# Patient Record
Sex: MALE | Race: BLACK OR AFRICAN AMERICAN
[De-identification: names, ages, dates, MRNs, and addresses within clinical notes are randomized per-mention and may not be internally consistent; named-entity substitution may affect disease eponyms.]

---

## 2019-11-13 ENCOUNTER — HOSPITAL ENCOUNTER (EMERGENCY)
Dept: HOSPITAL 62 - ER | Age: 64
LOS: 2 days | Discharge: ANOTHER HOSPITAL | End: 2019-11-15
Payer: MEDICARE

## 2019-11-13 DIAGNOSIS — I25.2: ICD-10-CM

## 2019-11-13 DIAGNOSIS — I11.0: ICD-10-CM

## 2019-11-13 DIAGNOSIS — R11.0: ICD-10-CM

## 2019-11-13 DIAGNOSIS — I50.9: ICD-10-CM

## 2019-11-13 DIAGNOSIS — I16.0: ICD-10-CM

## 2019-11-13 DIAGNOSIS — I25.10: ICD-10-CM

## 2019-11-13 DIAGNOSIS — R07.9: ICD-10-CM

## 2019-11-13 DIAGNOSIS — R20.0: ICD-10-CM

## 2019-11-13 DIAGNOSIS — I21.9: Primary | ICD-10-CM

## 2019-11-13 DIAGNOSIS — E11.9: ICD-10-CM

## 2019-11-13 DIAGNOSIS — R06.02: ICD-10-CM

## 2019-11-13 DIAGNOSIS — M79.601: ICD-10-CM

## 2019-11-13 DIAGNOSIS — Z87.891: ICD-10-CM

## 2019-11-13 DIAGNOSIS — J44.9: ICD-10-CM

## 2019-11-13 LAB
ADD MANUAL DIFF: NO
ALBUMIN SERPL-MCNC: 4.9 G/DL (ref 3.5–5)
ALP SERPL-CCNC: 59 U/L (ref 38–126)
ANION GAP SERPL CALC-SCNC: 11 MMOL/L (ref 5–19)
AST SERPL-CCNC: 36 U/L (ref 17–59)
BASOPHILS # BLD AUTO: 0 10^3/UL (ref 0–0.2)
BASOPHILS NFR BLD AUTO: 0.8 % (ref 0–2)
BILIRUB DIRECT SERPL-MCNC: 0.3 MG/DL (ref 0–0.4)
BILIRUB SERPL-MCNC: 1.3 MG/DL (ref 0.2–1.3)
BUN SERPL-MCNC: 17 MG/DL (ref 7–20)
CALCIUM: 9.8 MG/DL (ref 8.4–10.2)
CHLORIDE SERPL-SCNC: 100 MMOL/L (ref 98–107)
CK MB SERPL-MCNC: 12.1 NG/ML (ref ?–4.55)
CK SERPL-CCNC: 224 U/L (ref 55–170)
CO2 SERPL-SCNC: 28 MMOL/L (ref 22–30)
EOSINOPHIL # BLD AUTO: 0.2 10^3/UL (ref 0–0.6)
EOSINOPHIL NFR BLD AUTO: 4.3 % (ref 0–6)
ERYTHROCYTE [DISTWIDTH] IN BLOOD BY AUTOMATED COUNT: 14.4 % (ref 11.5–14)
GLUCOSE SERPL-MCNC: 123 MG/DL (ref 75–110)
HCT VFR BLD CALC: 53.7 % (ref 37.9–51)
HGB BLD-MCNC: 18.3 G/DL (ref 13.5–17)
LYMPHOCYTES # BLD AUTO: 1.8 10^3/UL (ref 0.5–4.7)
LYMPHOCYTES NFR BLD AUTO: 32.6 % (ref 13–45)
MCH RBC QN AUTO: 30.1 PG (ref 27–33.4)
MCHC RBC AUTO-ENTMCNC: 34 G/DL (ref 32–36)
MCV RBC AUTO: 89 FL (ref 80–97)
MONOCYTES # BLD AUTO: 0.6 10^3/UL (ref 0.1–1.4)
MONOCYTES NFR BLD AUTO: 9.8 % (ref 3–13)
NEUTROPHILS # BLD AUTO: 3 10^3/UL (ref 1.7–8.2)
NEUTS SEG NFR BLD AUTO: 52.5 % (ref 42–78)
PLATELET # BLD: 185 10^3/UL (ref 150–450)
POTASSIUM SERPL-SCNC: 4.5 MMOL/L (ref 3.6–5)
PROT SERPL-MCNC: 8.5 G/DL (ref 6.3–8.2)
RBC # BLD AUTO: 6.06 10^6/UL (ref 4.35–5.55)
TOTAL CELLS COUNTED % (AUTO): 100 %
TROPONIN I SERPL-MCNC: 0.76 NG/ML
WBC # BLD AUTO: 5.6 10^3/UL (ref 4–10.5)

## 2019-11-13 PROCEDURE — 71045 X-RAY EXAM CHEST 1 VIEW: CPT

## 2019-11-13 PROCEDURE — 96376 TX/PRO/DX INJ SAME DRUG ADON: CPT

## 2019-11-13 PROCEDURE — 85025 COMPLETE CBC W/AUTO DIFF WBC: CPT

## 2019-11-13 PROCEDURE — 99291 CRITICAL CARE FIRST HOUR: CPT

## 2019-11-13 PROCEDURE — 85730 THROMBOPLASTIN TIME PARTIAL: CPT

## 2019-11-13 PROCEDURE — 96368 THER/DIAG CONCURRENT INF: CPT

## 2019-11-13 PROCEDURE — 82553 CREATINE MB FRACTION: CPT

## 2019-11-13 PROCEDURE — 96365 THER/PROPH/DIAG IV INF INIT: CPT

## 2019-11-13 PROCEDURE — 36415 COLL VENOUS BLD VENIPUNCTURE: CPT

## 2019-11-13 PROCEDURE — 80053 COMPREHEN METABOLIC PANEL: CPT

## 2019-11-13 PROCEDURE — 93010 ELECTROCARDIOGRAM REPORT: CPT

## 2019-11-13 PROCEDURE — 82550 ASSAY OF CK (CPK): CPT

## 2019-11-13 PROCEDURE — 84484 ASSAY OF TROPONIN QUANT: CPT

## 2019-11-13 PROCEDURE — 93005 ELECTROCARDIOGRAM TRACING: CPT

## 2019-11-13 NOTE — EKG REPORT
SEVERITY:- ABNORMAL ECG -

SINUS RHYTHM

PROBABLE LEFT ATRIAL ABNORMALITY

RBBB AND LPFB

SIGNATURE FAXED FROM MD

INFERIOR INFARCT, POSSIBLY ACUTE EXTENSION VERSUS DYSKINETIC NFERIOR WALL.CORELATE CLINICALLY

:

Confirmed by: Gail Gutierrez MD 13-Nov-2019 11:07:14

## 2019-11-13 NOTE — EKG REPORT
SEVERITY:- ABNORMAL ECG -

SINUS TACHYCARDIA

AMAIRANI, CONSIDER BIATRIAL ABNORMALITIES

RBBB AND LPFB

INFERIOR INFARCT, ACUTE

ANTEROLATERAL INFARCT, AGE INDETERMINATE

:

Confirmed by: Gail Gutierrez MD 13-Nov-2019 11:08:06

## 2019-11-13 NOTE — EKG REPORT
SEVERITY:- ABNORMAL ECG -

SINUS RHYTHM

VENTRICULAR TRIGEMINY

PROBABLE LEFT ATRIAL ABNORMALITY

RBBB AND LPFB

ST DEPRESSION, CONSIDER ISCHEMIA, LAT LEADS

:

Confirmed by: Gail Gutierrez MD 13-Nov-2019 11:05:01

## 2019-11-13 NOTE — ER DOCUMENT REPORT
ED Cardiac





- General


Chief Complaint: Chest Pain


Stated Complaint: CHEST PAIN/RIGHT ARM PAIN


Time Seen by Provider: 11/13/19 06:55


Primary Care Provider: 


PREETI THOMSON MD [Primary Care Provider] - Follow up as needed


Mode of Arrival: Ambulatory


Information source: Patient


TRAVEL OUTSIDE OF THE [place] IN LAST 30 DAYS: No





- HPI


Notes: 





Patient presents with chest pain since 1:30 AM this morning.  Patient states it 

radiates to his right arm.  He states it has been intermittent.  He does not 

know of anything that makes it better or worse.  He states he took 2 

nitroglycerin at home without significant change of the pain.  He states that 

the pain is lasting several minutes at a time.  Patient had some mild shortness 

of breath and some mild nausea.  He states he also has some numbness in his 

right arm.  He says he has had 3 previous stents place.  He also states he has 

had 3 strokes last one about 3 years ago.  Patient denies any cocaine or illicit

drug use.  He says he is not a smoker.  Patient had no vomiting or diarrhea.  No

recent cough cold or congestion.  Says in general he just does not feel well.  

The pain has been moderate to severe when he has it.





- Related Data


Allergies/Adverse Reactions: 


                                        





Iodinated Contrast Media [IV Dye, Iodine Containing] Allergy (Verified 07/10/18 

15:08)


   SKIN ROUGH











Past Medical History





- General


Information source: Patient





- Social History


Smoking Status: Former Smoker


Frequency of alcohol use: None


Drug Abuse: None


Family History: Reviewed & Not Pertinent, Hypertension





- Past Medical History


Cardiac Medical History: Reports: Hx Congestive Heart Failure, Hx Coronary 

Artery Disease, Hx Heart Attack - [age], Hx Hypercholesterolemia, Hx Hypertension


Pulmonary Medical History: Reports: Hx Asthma, Hx Bronchitis, Hx COPD, Hx 

Pneumonia


Neurological Medical History: Reports: Hx Cerebrovascular Accident - 2011.  

Denies: Hx Seizures


Endocrine Medical History: Reports: Hx Diabetes Mellitus Type 2


Renal/ Medical History: Reports: Hx Benign Prostatic Hyperplasia.  Denies: Hx 

Peritoneal Dialysis


GI Medical History: Reports: Hx Gastroesophageal Reflux Disease


Musculoskeletal Medical History: Denies Hx Arthritis


Psychiatric Medical History: 


   Denies: Hx Depression


Past Surgical History: Reports: Hx Abdominal Surgery - hernia, Hx Cardiac 

Catheterization, Hx Cardiac Surgery - 8 stents, bypass sx, defibrilator, Hx 

Cholecystectomy, Hx Coronary Artery Bypass Graft - x 3, Hx Coronary Stent - x 8,

Hx Pacemaker, Hx Tonsillectomy





- Immunizations


Immunizations up to date: Yes


Hx Diphtheria, Pertussis, Tetanus Vaccination: Yes


Hx Pneumococcal Vaccination: 08/18/14





Review of Systems





- Review of Systems


Constitutional: denies: Chills, Fever


Cardiovascular: Chest pain.  denies: Palpitations


Respiratory: Short of breath.  denies: Cough


Gastrointestinal: denies: Diarrhea, Vomiting


-: Yes All other systems reviewed and negative





Physical Exam





- Vital signs


Interpretation: Normal





- General


General appearance: Appears well, Alert





- HEENT


Head: Normocephalic, Atraumatic


Eyes: Normal


Pupils: PERRL





- Respiratory


Respiratory status: No respiratory distress


Chest status: Nontender


Breath sounds: Normal


Chest palpation: Normal





- Cardiovascular


Rhythm: Tachycardia


Heart sounds: Normal auscultation


Murmur: No





- Abdominal


Inspection: Normal


Distension: No distension


Bowel sounds: Normal


Tenderness: Nontender


Organomegaly: No organomegaly





- Back


Back: Normal, Nontender





- Extremities


General upper extremity: Normal inspection, Nontender, Normal color, Normal ROM,

Normal temperature


General lower extremity: Normal inspection, Nontender, Normal color, Normal ROM,

Normal temperature, Normal weight bearing.  No: Dario's sign





- Neurological


Neuro grossly intact: Yes


Cognition: Normal


Orientation: AAOx4


Spencer Coma Scale Eye Opening: Spontaneous


Spencer Coma Scale Verbal: Oriented


Spencer Coma Scale Motor: Obeys Commands


Emiliano Coma Scale Total: 15


Speech: Normal


Motor strength normal: LUE, RUE, LLE, RLE


Sensory: Normal





- Psychological


Associated symptoms: Normal affect, Normal mood





- Skin


Skin Temperature: Warm


Skin Moisture: Dry


Skin Color: Normal





Course





- Re-evaluation


Re-evalutation: 





11/13/19 07:36


Patient seen upon arrival.  EKG showed acute inferior myocardial infarction.  

The changes were new compared to old EKG.  A STEMI protocol was activated.  At 

this point patient has been given heparin and Plavix and aspirin.  He states 

that he is now pain-free.  He was also given a sublingual nitroglycerin here.  

Patient has one absolute contraindication and 3 relative contraindications to 

lytics.  His absolute at this time is his significantly elevated blood pressure 

which was approximately 230 systolic.  He has been placed on a nitro drip at 

this time to bring down the blood pressure.  He states he is now pain-free.  

Repeat EKG when the patient is pain-free shows significant decrease of the ST 

elevation.  At this time lytics will not be given because the patient is pain-

free, EKG is improving, blood pressure is not within parameters, and patient has

3 relative contraindications.  Patient will constantly be reassessed to see if 

lytics are indicated.  Patient is currently awaiting transportation to [facility].  I have spoke with her cardiologist and informed him of the patient and

the EKG changes.





- Diagnostic Test


Radiology reviewed: Image reviewed, Reports reviewed





- EKG Interpretation by Me


EKG shows normal: Sinus rhythm


Rate: Tachycardia - 104


Rhythm: NSR


Axis/QRS: RBBB, LAHB/LAFB


When compared to previous EKG there are: Changes noted


Additional EKG results interpreted by me: 





11/13/19 07:36


Patient has ST elevation in leads II and III with reciprocal depression and 

multiple leads including 1 and aVL.  EKG appears consistent with an acute 

inferior myocardial infarction.





Critical Care Note





- Critical Care Note


Total time excluding time spent on procedures (mins): 50


Comments: 





Critical care time on this patient was approximately 50 minutes.  This included 

multiple reassessments for management of his acute myocardial infarction and 

uncontrolled high blood pressure.  It included talking to multiple consultants. 

It included reviewing imaging and laboratory studies.





Discharge





- Discharge


Clinical Impression: 


 Acute inferior myocardial infarction, Hypertensive emergency





Condition: Critical


Disposition: [facility]


Referrals: 


PREETI THOMSON MD [Primary Care Provider] - Follow up as needed

## 2019-11-13 NOTE — RADIOLOGY REPORT (SQ)
Chest single view on  11/13/2019 at 7:10 AM 



CLINICAL INDICATION: Chest pain



COMPARISON: 5/25/2018



FINDINGS: The patient is status post median sternotomy and CABG.

2-lead left subclavian AICD device is unchanged in position.

Cardiomegaly is noted. Mild increased interstitial changes may be

chronic in nature versus mild edema. Lungs are otherwise clear.

Hilar and mediastinal contours are within normal limits.



IMPRESSION: No significant change in the appearance of the chest.

## 2019-11-13 NOTE — EKG REPORT
SEVERITY:- ABNORMAL ECG -

SINUS TACHYCARDIA

LEFT ATRIAL ABNORMALITY

RBBB AND LPFB

INFERIOR INFARCT

ST DEPRESSION, CONSIDER ISCHEMIA, LAT LEADS

:

Confirmed by: Gail Gutierrez MD 13-Nov-2019 11:07:52